# Patient Record
Sex: MALE | Race: WHITE
[De-identification: names, ages, dates, MRNs, and addresses within clinical notes are randomized per-mention and may not be internally consistent; named-entity substitution may affect disease eponyms.]

---

## 2017-10-18 ENCOUNTER — HOSPITAL ENCOUNTER (EMERGENCY)
Dept: HOSPITAL 31 - C.ER | Age: 47
Discharge: HOME | End: 2017-10-18
Payer: COMMERCIAL

## 2017-10-18 VITALS — HEART RATE: 88 BPM | SYSTOLIC BLOOD PRESSURE: 120 MMHG | TEMPERATURE: 98.3 F | DIASTOLIC BLOOD PRESSURE: 81 MMHG

## 2017-10-18 VITALS — RESPIRATION RATE: 18 BRPM

## 2017-10-18 VITALS — OXYGEN SATURATION: 98 %

## 2017-10-18 DIAGNOSIS — S93.401A: Primary | ICD-10-CM
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
## 2017-10-18 NOTE — C.PDOC
History Of Present Illness


48 y/o male presents to ED with complaints of persistent right ankle pain for 4 

days. Patient states on Sunday he slipped off steps and injured right ankle. 

Patient reports pain is worse when walking and denies any other new injury, loc

, head injury or any other complaints at this time.  


Time Seen by Provider: 10/18/17 07:25


Chief Complaint (Nursing): Lower Extremity Problem/Injury


History Per: Patient


History/Exam Limitations: no limitations


Onset/Duration Of Symptoms: Days


Current Symptoms Are (Timing): Still Present


Severity: Mild


Recent travel outside of the [place]: No





- Knee


Description Of Injury: Twisted





- Ankle/Foot


Description Of Injury: Fell, Twisted





Past Medical History


Reviewed: Historical Data, Nursing Documentation, Vital Signs


Vital Signs: 


 Last Vital Signs











Temp  98.3 F   10/18/17 08:40


 


Pulse  88   10/18/17 08:40


 


Resp  18   10/18/17 08:40


 


BP  120/81   10/18/17 08:40


 


Pulse Ox  95   10/18/17 08:40














- Medical History


PMH: HTN


Surgical History: No Surg Hx





- [facility] Procedures








INJECT/INFUSE NEC (07/11/14)








Family History: States: No Known Family Hx





- Social History


Hx Alcohol Use: Yes


Hx Substance Use: No





- Immunization History


Hx Tetanus Toxoid Vaccination: No


Hx Influenza Vaccination: No


Hx Pneumococcal Vaccination: No





Review Of Systems


Constitutional: Negative for: Fever, Chills


Eyes: Negative for: Vision Change


Cardiovascular: Negative for: Chest Pain


Respiratory: Negative for: Shortness of Breath


Musculoskeletal: Positive for: Foot Pain


Skin: Negative for: Rash


Neurological: Negative for: Weakness, Numbness, Headache





Physical Exam





- Physical Exam


Appears: Non-toxic, No Acute Distress


Skin: Warm, Dry, No Rash


Head: Normacephalic


Eye(s): bilateral: Normal Inspection


Oral Mucosa: Moist


Neck: Normal ROM, Supple


Chest: Symmetrical


Extremity: Tenderness (diffuse to right ankle), Capillary Refill (<2 seconds), 

No Deformity, No Swelling


Extremity: Bilateral: Normal ROM


Pulses: Left Dorsalis Pedis: Normal, Right Dorsalis Pedis: Normal


Neurological/Psych: Oriented x3, Normal Motor, Normal Sensation





ED Course And Treatment


O2 Sat by Pulse Oximetry: 98 (RA)


Pulse Ox Interpretation: Normal





- Other Rad


  ** No standard instances


X-Ray: Interpreted by Me


Interpretation: Right ankle: no fx


Progress Note: Treated with tylenol 650 mg PO.  Ace bandage and aircast 

applied.  Treated with crutches


Reassessment Condition: Improved





Medical Decision Making


Medical Decision Making: 


Plan: XRAY, Tylenol 





Disposition


Counseled Patient/Family Regarding: Studies Performed, Diagnosis, Need For 

Followup





- Disposition


Referrals: 


[facility]iatry Clinic [Outside]


[facility] [place] [Outside]


Disposition: HOME/ ROUTINE


Disposition Time: 08:30


Condition: STABLE


Additional Instructions: 


air cast and ace bandage





Motrin as needed for pain


Instructions:  Ankle Sprain (ED), Ankle Stirrup Splint (ED)


Forms:  [facility] (English)





- POA


Present On Arrival: None





- Clinical Impression


Clinical Impression: 


 Ankle sprain








- PA / NP / Resident Statement


MD/DO has reviewed & agrees with the documentation as recorded.





- Scribe Statement


The provider has reviewed the documentation as recorded by the Scribanyi Carias





All medical record entries made by the Rejiibanyi were at my direction and 

personally dictated by me. I have reviewed the chart and agree that the record 

accurately reflects my personal performance of the history, physical exam, 

medical decision making, and the department course for this patient. I have 

also personally directed, reviewed, and agree with the discharge instructions 

and disposition.

## 2018-01-27 ENCOUNTER — HOSPITAL ENCOUNTER (EMERGENCY)
Dept: HOSPITAL 14 - H.ER | Age: 48
Discharge: HOME | End: 2018-01-27
Payer: COMMERCIAL

## 2018-01-27 VITALS
DIASTOLIC BLOOD PRESSURE: 70 MMHG | RESPIRATION RATE: 20 BRPM | TEMPERATURE: 100.6 F | HEART RATE: 89 BPM | SYSTOLIC BLOOD PRESSURE: 136 MMHG | OXYGEN SATURATION: 99 %

## 2018-01-27 DIAGNOSIS — R05: ICD-10-CM

## 2018-01-27 DIAGNOSIS — R50.9: Primary | ICD-10-CM

## 2018-01-27 DIAGNOSIS — I10: ICD-10-CM

## 2018-01-27 NOTE — ED PDOC
HPI: General Adult


Time Seen by Provider: 18 18:21


Chief Complaint (Nursing): Flu-like Symptoms


Chief Complaint (Provider): Flu-Like Symptoms


History Per: Patient


History/Exam Limitations: no limitations


Onset/Duration Of Symptoms: Days (x1)


Have you had recent travel within the past 21 days to any of the following 

countries: [place], [place], [place] or [place]?: No


Current Symptoms Are (Timing): Still Present


Additional Complaint(s): 





47 year old male presents to ED with complaints of flu-like symptoms x1 day and 

a past medical history of HTN. (+) cough, congestion, and fever. (-) hemoptysis

, chest pain, SOB, nausea, vomiting, or abdominal pain. Notes that one of his 

children was recently diagnosed with the flu. States that he has been taking 

samples of Tamiflu that his wife acquired from her job. Notes taking 2 and that 

there are 6 left at home. 





PCP: Non [facility]





Past Medical History


Reviewed: Historical Data, Nursing Documentation, Vital Signs


Vital Signs: 


 Last Vital Signs











Temp  100.6 F H  18 18:44


 


Pulse  89   18 18:44


 


Resp  20   18 18:44


 


BP  136/70   18 18:44


 


Pulse Ox  99   18 18:59














- Medical History


PMH: HTN


   Denies: No Chronic Diseases





- Family History


Family History: States: No Known Family Hx





- Living Arrangements


Living Arrangements: With Family





- Social History


Drugs: Denies





- Immunization History


Hx Tetanus Toxoid Vaccination: No


Hx Influenza Vaccination: No


Hx Pneumococcal Vaccination: No





- Home Medications


Home Medications: 


 Ambulatory Orders











 Medication  Instructions  Recorded


 


Oseltamivir [Tamiflu] 75 mg PO BID #4 cap 18














- Allergies


Allergies/Adverse Reactions: 


 Allergies











Allergy/AdvReac Type Severity Reaction Status Date / Time


 


No Known Allergies Allergy   Unverified 10/18/17 06:47














Review of Systems


ROS Statement: Except As Marked, All Systems Reviewed And Found Negative


Constitutional: Positive for: Fever


ENT: Positive for: Nose Congestion


Cardiovascular: Negative for: Chest Pain


Respiratory: Positive for: Cough.  Negative for: Shortness of Breath, Hemoptysis


Gastrointestinal: Negative for: Nausea, Vomiting, Abdominal Pain





Physical Exam





- Reviewed


Nursing Documentation Reviewed: Yes


Vital Signs Reviewed: Yes





- Physical Exam


Appears: Positive for: Non-toxic, No Acute Distress


Skin: Positive for: Normal Color, Warm, Dry


Eye Exam: Positive for: Normal appearance, EOMI, PERRL


ENT: Positive for: Normal ENT Inspection


Neck: Positive for: Normal, Painless ROM, Supple


Cardiovascular/Chest: Positive for: Regular Rate, Rhythm


Respiratory: Positive for: Normal Breath Sounds.  Negative for: Respiratory 

Distress


Gastrointestinal/Abdominal: Positive for: Soft.  Negative for: Tenderness


Neurologic/Psych: Positive for: Alert, Oriented





- ECG


O2 Sat by Pulse Oximetry: 99 (RA)


Pulse Ox Interpretation: Normal





Medical Decision Making


Medical Decision Makin


Initial impression: influenza-like illness








Scribe Attestation:


Documented by Tawana Ordonez, acting as a scribe for Jaime Medina PA-C.





Provider Scribe Attestation:


All medical record entries made by the Scribe were at my direction and 

personally dictated by me. I have reviewed the chart and agree that the record 

accurately reflects my personal performance of the history, physical exam, 

medical decision making, and the department course for this patient. I have 

also personally directed, reviewed, and agree with the discharge instructions 

and disposition.











Disposition





- Clinical Impression


Clinical Impression: 


 Influenza-like symptoms








- Patient ED Disposition


Is Patient to be Admitted: No





- Disposition


Disposition: Routine/Home


Disposition Time: 18:58


Condition: STABLE


Prescriptions: 


Oseltamivir [Tamiflu] 75 mg PO BID #4 cap


Instructions:  Influenza (ED)


Forms:  [facility] ED School/Work Excuse

## 2018-05-24 ENCOUNTER — HOSPITAL ENCOUNTER (EMERGENCY)
Dept: HOSPITAL 14 - H.ER | Age: 48
Discharge: HOME | End: 2018-05-24
Payer: COMMERCIAL

## 2018-05-24 VITALS
HEART RATE: 78 BPM | RESPIRATION RATE: 18 BRPM | DIASTOLIC BLOOD PRESSURE: 78 MMHG | SYSTOLIC BLOOD PRESSURE: 137 MMHG | TEMPERATURE: 98.6 F | OXYGEN SATURATION: 99 %

## 2018-05-24 DIAGNOSIS — I10: ICD-10-CM

## 2018-05-24 DIAGNOSIS — L02.512: Primary | ICD-10-CM

## 2018-05-24 NOTE — ED PDOC
HPI: Skin/Bite Injury


Time Seen by Provider: 05/24/18 21:43


Chief Complaint (Nursing): Bite


Chief Complaint (Provider): Left 4th digit infection x 2 days 


History Per: Patient


History/Exam Limitations: no limitations


Onset/Duration Of Symptoms: Days


Current Symptoms Are (Timing): Still Present


Quality Of Symptoms: Painful, Draining


Severity: Mild


Pain Scale Rating Of: 2


Additional Complaint(s): 





49 yo male with HTN presents with abscess on the left 4th digit. Pt states he 

has been going warm soaks with epison salt and topical antibiotic ointment. Pt 

states it has been draining. Pt states it has improved. Pt reports waking up 

with the swelling. 





Past Medical History


Reviewed: Historical Data, Nursing Documentation, Vital Signs


Vital Signs: 





 Last Vital Signs











Temp  98.6 F   05/24/18 21:36


 


Pulse  78   05/24/18 21:36


 


Resp  18   05/24/18 21:36


 


BP  137/78   05/24/18 21:36


 


Pulse Ox  99   05/24/18 21:36














- Medical History


PMH: HTN





- Surgical History


Surgical History: No Surg Hx





- Family History


Family History: States: No Known Family Hx





- Living Arrangements


Living Arrangements: With Family





- Social History


Current smoker - smoking cessation education provided: No





- Immunization History


Hx Tetanus Toxoid Vaccination: No


Hx Influenza Vaccination: No


Hx Pneumococcal Vaccination: No





- Home Medications


Home Medications: 


 Ambulatory Orders











 Medication  Instructions  Recorded


 


Oseltamivir [Tamiflu] 75 mg PO BID #4 cap 01/27/18


 


Clindamycin [Cleocin] 300 mg PO QID #40 cap 05/24/18














- Allergies


Allergies/Adverse Reactions: 


 Allergies











Allergy/AdvReac Type Severity Reaction Status Date / Time


 


No Known Allergies Allergy   Unverified 10/18/17 06:47














Review of Systems


ROS Statement: Except As Marked, All Systems Reviewed And Found Negative


Constitutional: Negative for: Fever, Chills


Skin: Positive for: Other





Physical Exam





- Reviewed


Nursing Documentation Reviewed: Yes


Vital Signs Reviewed: Yes





- Physical Exam


Appears: Positive for: Well, Non-toxic, No Acute Distress


Head Exam: Positive for: ATRAUMATIC, NORMAL INSPECTION, NORMOCEPHALIC


Skin: Positive for: Warm.  Negative for: Normal Color ((+) abscess - Drainage 

left 4th digit, posterior )


Eye Exam: Positive for: Normal appearance


ENT: Positive for: Normal ENT Inspection


Neck: Positive for: Normal, Painless ROM


Respiratory: Negative for: Accessory Muscle Use, Respiratory Distress


Back: Positive for: Normal Inspection


Extremity: Positive for: Normal ROM


Neurologic/Psych: Positive for: Alert, Oriented





- ECG


O2 Sat by Pulse Oximetry: 99





Medical Decision Making


Medical Decision Making: 





Discussed continuing warm soaks and oral antibiotics. Return for worsening 

symptoms. 





Disposition





- Clinical Impression


Clinical Impression: 


 Abscess








- Patient ED Disposition


Is Patient to be Admitted: No


Counseled Patient/Family Regarding: Diagnosis, Need For Followup, Rx Given





- Disposition


Disposition: Routine/Home


Disposition Time: 22:20


Condition: STABLE


Prescriptions: 


Clindamycin [Cleocin] 300 mg PO QID #40 cap


Instructions:  Skin Abscess